# Patient Record
Sex: FEMALE | Race: WHITE
[De-identification: names, ages, dates, MRNs, and addresses within clinical notes are randomized per-mention and may not be internally consistent; named-entity substitution may affect disease eponyms.]

---

## 2017-12-26 ENCOUNTER — HOSPITAL ENCOUNTER (OUTPATIENT)
Dept: HOSPITAL 76 - LAB.N | Age: 42
Discharge: HOME | End: 2017-12-26
Attending: NURSE PRACTITIONER
Payer: COMMERCIAL

## 2017-12-26 DIAGNOSIS — Z79.899: Primary | ICD-10-CM

## 2017-12-26 LAB
ALBUMIN/GLOB SERPL: 1.6 {RATIO} (ref 1–2.2)
ANION GAP SERPL CALCULATED.4IONS-SCNC: 6 MMOL/L (ref 6–13)
BASOPHILS NFR BLD AUTO: 0 10^3/UL (ref 0–0.1)
BASOPHILS NFR BLD AUTO: 0.6 %
BILIRUB BLD-MCNC: 0.4 MG/DL (ref 0.2–1)
BUN SERPL-MCNC: 7 MG/DL (ref 6–20)
CALCIUM UR-MCNC: 9.1 MG/DL (ref 8.5–10.3)
CHLORIDE SERPL-SCNC: 99 MMOL/L (ref 101–111)
CHOLEST SERPL-MCNC: 177 MG/DL
CO2 SERPL-SCNC: 28 MMOL/L (ref 21–32)
CREAT SERPLBLD-SCNC: 0.6 MG/DL (ref 0.4–1)
EOSINOPHIL # BLD AUTO: 0.4 10^3/UL (ref 0–0.7)
EOSINOPHIL NFR BLD AUTO: 6.4 %
ERYTHROCYTE [DISTWIDTH] IN BLOOD BY AUTOMATED COUNT: 12.8 % (ref 12–15)
GFRSERPLBLD MDRD-ARVRAT: 110 ML/MIN/{1.73_M2} (ref 89–?)
GLOBULIN SER-MCNC: 2.7 G/DL (ref 2.1–4.2)
GLUCOSE SERPL-MCNC: 74 MG/DL (ref 70–100)
HCT VFR BLD AUTO: 35.6 % (ref 37–47)
HDLC SERPL-MCNC: 67 MG/DL
HDLC SERPL: 2.6 {RATIO} (ref ?–4.4)
HGB UR QL STRIP: 12.1 G/DL (ref 12–16)
LDLC/HDLC SERPL: 1.5 {RATIO} (ref ?–4.4)
LYMPHOCYTES # SPEC AUTO: 2.5 10^3/UL (ref 1.5–3.5)
LYMPHOCYTES NFR BLD AUTO: 40.4 %
MCH RBC QN AUTO: 32.9 PG (ref 27–31)
MCHC RBC AUTO-ENTMCNC: 33.9 G/DL (ref 32–36)
MCV RBC AUTO: 97 FL (ref 81–99)
MONOCYTES # BLD AUTO: 0.4 10^3/UL (ref 0–1)
MONOCYTES NFR BLD AUTO: 5.8 %
NEUTROPHILS # BLD AUTO: 2.9 10^3/UL (ref 1.5–6.6)
NEUTROPHILS # SNV AUTO: 6.2 X10^3/UL (ref 4.8–10.8)
NEUTROPHILS NFR BLD AUTO: 46.8 %
NRBC # BLD AUTO: 0 /100WBC
PDW BLD AUTO: 8.6 FL (ref 7.9–10.8)
POTASSIUM SERPL-SCNC: 3.7 MMOL/L (ref 3.5–5)
PROT SPEC-MCNC: 6.9 G/DL (ref 6.7–8.2)
RBC MAR: 3.68 10^6/UL (ref 4.2–5.4)
SODIUM SERPLBLD-SCNC: 133 MMOL/L (ref 135–145)
TRIGL P FAST SERPL-MCNC: 50 MG/DL
VLDLC SERPL-SCNC: 10 MG/DL
WBC # BLD: 6.2 X10^3/UL

## 2017-12-26 PROCEDURE — 80061 LIPID PANEL: CPT

## 2017-12-26 PROCEDURE — 84443 ASSAY THYROID STIM HORMONE: CPT

## 2017-12-26 PROCEDURE — 36415 COLL VENOUS BLD VENIPUNCTURE: CPT

## 2017-12-26 PROCEDURE — 80053 COMPREHEN METABOLIC PANEL: CPT

## 2017-12-26 PROCEDURE — 85025 COMPLETE CBC W/AUTO DIFF WBC: CPT

## 2018-06-13 ENCOUNTER — HOSPITAL ENCOUNTER (OUTPATIENT)
Dept: HOSPITAL 76 - DI | Age: 43
Discharge: HOME | End: 2018-06-13
Attending: NURSE PRACTITIONER
Payer: COMMERCIAL

## 2018-06-13 DIAGNOSIS — M85.80: Primary | ICD-10-CM

## 2018-06-13 DIAGNOSIS — M85.88: ICD-10-CM

## 2018-06-13 DIAGNOSIS — M25.552: Primary | ICD-10-CM

## 2018-06-13 DIAGNOSIS — M25.552: ICD-10-CM

## 2018-06-13 PROCEDURE — 77080 DXA BONE DENSITY AXIAL: CPT

## 2018-06-13 NOTE — DEXA REPORT
Procedure Date:  06/13/2018   

Accession Number:  [id number] / [id number]                    

Procedure:  DEX - Dexa Spine and/or Hip CPT Code:  

 

FULL RESULT:

 

 

CLINICAL INDICATION: OSTEOPENIA

 

TECHNIQUE: Dual energy x-ray absorptiometry (DXA) was performed on a [employer] System.  Regions measured are the AP Spine, femoral neck, and if 

needed forearm.

 

COMPARISON: None.

 

In accordance with the International Society for Clinical Densitometry 

(ISCD) guidelines, data from previous exams may be reanalyzed using 

current recommendations and techniques.  This is done to allow a more 

accurate basis for comparison with the current study.

 

FINDINGS: The data for the lumbar spine is as follows:

 

                 BMD (g/cm/cm)         T-SCORE          Z-SCORE

 REGION

 L1                   0.791                   -2.8                   -2.5

 L2                   0.936                   -2.2                   -1.8

 L3                   0.979                   -1.8                   -1.5

 L4                   1.058                   -1.2                   -0.8

TOTAL              0.947                   -1.9                 -1.6

 

NOTE: All evaluable vertebrae are used for classification

 

The data for the hip is as follows:

 

                 BMD (g/cm/cm)         T-SCORE          Z-SCORE

 REGION

 Neck             0.998                       -0.3              0.5

TOTAL            1.056                       0.4            0.9

 

NOTE: The femoral neck or total proximal femur, whichever is lowest, is 

used for classification.

 

IMPRESSION: THE WHO CLASSIFICATION BASED ON THE INTERNATIONAL REFERENCE 

STANDARD IS OSTEOPENIA.  THE FRACTURE RISK IS INCREASED.

 

RECOMMENDATION: Patients with diagnosis of osteoporosis or osteopenia 

should have regular bone mineral density assessment.  For those eligible 

for Medicare, routine testing is allowed once every 2 years.  Testing 

frequency can be increased for patients who have rapidly progressing 

disease or for those who are receiving medical therapy to restore bone 

mass.

 

COMMENT:  World Health Organization (WHO) definitions for osteoporosis 

and osteopenia:

NORMAL BMD:  T-score at -1.0 or higher, fracture risk is low

OSTEOPENIA BMD:  T-score between -1.0 and -2.5, fracture risk is 

increased.

OSTEOPOROSIS BMD:  T-score at -2.5 or lower, fracture risk is high.

 

National Osteoporosis Foundation recommends:

1. Obtain adequate dietary calcium (at least 1200 mg per day) and vitamin 

D (400-800 international units per day).

2. Participate, as appropriate, in regular weightbearing and 

muscle-strengthening exercise.

3. Avoid tobacco use and reduce alcohol and caffeine intake.

4. For more detailed information see the website at www.NOF.org.

## 2018-06-13 NOTE — XRAY REPORT
Procedure Date:  06/13/2018   

Accession Number:  [id number] / [id number]                    

Procedure:  XR  - Hip w/Pelvis 2-3V LT CPT Code:  

 

FULL RESULT:

 

 

EXAM: Hip w/Pelvis 2-3V LT

 

DATE: 6/13/2018 8:56 AM

 

CLINICAL HISTORY: HIP JOINT PAIN,LEFT

 

COMPARISON: None

 

TECHNIQUE: Frontal view of the hips and pelvis and frog-leg lateral view 

of the left hip

 

FINDINGS:

There is no evidence of fracture or dislocation. The joint spaces are 

preserved. No radiopaque foreign body is seen in the soft tissues.

 

IMPRESSION:

Normal left hip.

## 2020-03-13 ENCOUNTER — RECORDS - HEALTHEAST (OUTPATIENT)
Dept: LAB | Facility: CLINIC | Age: 45
End: 2020-03-13

## 2020-03-13 LAB
CHOLEST SERPL-MCNC: 181 MG/DL
FASTING STATUS PATIENT QL REPORTED: NORMAL
HDLC SERPL-MCNC: 65 MG/DL
LDLC SERPL CALC-MCNC: 101 MG/DL
TRIGL SERPL-MCNC: 75 MG/DL

## 2020-06-15 ENCOUNTER — RECORDS - HEALTHEAST (OUTPATIENT)
Dept: LAB | Facility: CLINIC | Age: 45
End: 2020-06-15

## 2020-06-15 ENCOUNTER — HOSPITAL ENCOUNTER (OUTPATIENT)
Dept: LAB | Age: 45
Setting detail: SPECIMEN
Discharge: HOME | End: 2020-06-15

## 2020-06-15 LAB
ALBUMIN SERPL-MCNC: 4.2 G/DL (ref 3.5–5)
ANION GAP SERPL CALCULATED.3IONS-SCNC: 6 MMOL/L (ref 5–18)
BUN SERPL-MCNC: 11 MG/DL (ref 8–22)
CALCIUM SERPL-MCNC: 9.3 MG/DL (ref 8.5–10.5)
CHLORIDE BLD-SCNC: 106 MMOL/L (ref 98–107)
CO2 SERPL-SCNC: 29 MMOL/L (ref 22–31)
CREAT SERPL-MCNC: 0.73 MG/DL (ref 0.6–1.1)
GFR SERPL CREATININE-BSD FRML MDRD: >60 ML/MIN/1.73M2
GLUCOSE BLD-MCNC: 87 MG/DL (ref 70–125)
PHOSPHATE SERPL-MCNC: 3.5 MG/DL (ref 2.5–4.5)
POTASSIUM BLD-SCNC: 4.7 MMOL/L (ref 3.5–5)
SODIUM SERPL-SCNC: 141 MMOL/L (ref 136–145)